# Patient Record
Sex: MALE | Race: BLACK OR AFRICAN AMERICAN
[De-identification: names, ages, dates, MRNs, and addresses within clinical notes are randomized per-mention and may not be internally consistent; named-entity substitution may affect disease eponyms.]

---

## 2020-08-24 ENCOUNTER — HOSPITAL ENCOUNTER (INPATIENT)
Dept: HOSPITAL 74 - YASAS | Age: 60
LOS: 18 days | Discharge: HOME | DRG: 772 | End: 2020-09-11
Attending: ALLERGY & IMMUNOLOGY | Admitting: ALLERGY & IMMUNOLOGY
Payer: COMMERCIAL

## 2020-08-24 VITALS — BODY MASS INDEX: 60 KG/M2

## 2020-08-24 DIAGNOSIS — Z86.19: ICD-10-CM

## 2020-08-24 DIAGNOSIS — F14.20: Primary | ICD-10-CM

## 2020-08-24 DIAGNOSIS — F17.211: ICD-10-CM

## 2020-08-24 DIAGNOSIS — Z87.09: ICD-10-CM

## 2020-08-24 PROCEDURE — HZ42ZZZ GROUP COUNSELING FOR SUBSTANCE ABUSE TREATMENT, COGNITIVE-BEHAVIORAL: ICD-10-PCS | Performed by: ALLERGY & IMMUNOLOGY

## 2020-08-24 RX ADMIN — Medication SCH TAB: at 20:20

## 2020-08-24 RX ADMIN — HYDROXYZINE PAMOATE SCH MG: 25 CAPSULE ORAL at 21:18

## 2020-08-24 RX ADMIN — Medication SCH: at 21:17

## 2020-08-24 RX ADMIN — Medication SCH MG: at 21:17

## 2020-08-24 NOTE — HP
CIWA Score





- Admission Criteria


[facility] Guidelines: Admission for Medically Managed Detox: 


Requires at least one of the followin. CIWA greater than 12


2. Seizures within the past 24 hours


3. Delirium tremens within the past 24 hours


4. Hallucinations within the past 24 hours


5. Acute intervention needed for co  occurring medical disorder


6. Acute intervention needed for co  occurring psychiatric disorder


7. Severe withdrawal that cannot be handled at a lower level of care (continued


    vomiting, continued diarrhea, abnormal vital signs) requiring intravenous


    medication and/or fluids


8. Pregnancy








Admitting History and Physical





- Admission


Chief Complaint: Cocaine abuse


History of Present Illness: 





Patient is a 59 y/o male who is here for cocaine abuse. Used cocaine yesterday, 

first started using 40 years ago. Takes years off in between, 7-8 years clean, 

then 3 years clean. Has been using for a year and a half straight. Use 3-4 times

a week, a gram or a gram and a half. Has never had physical symptoms of wi

thdrawl. Mental cravings. 





- Past Surgical History


Additional Past Surgical History: 





pneumothorax, R and L ankle surgeries





- Smoking History


Smoking history: Never smoked





- Alcohol/Substance Use


Hx Alcohol Use: Yes (socially)





- Social History


Usual Living Arrangement: Yes: With Spouse


Do you think of yourself as: Straight/Heterosexual


Occupation: construction 


History of Recent Travel: No





Admission ROS [facility]





- HPI


Chief Complaint: 





cocaine abuse


Allergies/Adverse Reactions: 


                                    Allergies











Allergy/AdvReac Type Severity Reaction Status Date / Time


 


No Known Allergies Allergy   Verified 20 18:50











Exam Limitations: No Limitations





- Ebola screening


Have you traveled outside of the country in the last 21 days: No


Have you had contact with anyone from an Ebola affected area: No


Have you been sick,other than usual withdrawal symptoms: No


Do you have a fever: No





- Review of Systems


Constitutional: No Symptoms Reported


Respiratory: reports: No Symptoms reported


Cardiac: reports: No Symptoms Reported


GI: reports: No Symptoms Reported





Patient History





- Smoking Cessation


Smoking history: Former smoker


Have you smoked in the past 12 months: Yes


Initiated information on smoking cessation: No





- Substances abused


  ** Cocaine


Substance route: Inhalation


Frequency: 3-6 times per week


Amount used: 1 1/2 gm


Age of first use: 25


Date of last use: 21





Admission Physical Exam [facility]





- Vital Signs


Vital Signs: 


                               Vital Signs - 24 hr











  20





  17:27


 


Temperature 97.2 F L


 


Pulse Rate 72


 


Respiratory 18





Rate 


 


Blood Pressure 124/88














- Physical


General Appearance: Yes: No Apparent Distress


HEENTM: Yes: Hearing grossly Normal


Respiratory: Yes: Normal Breath Sounds, No Respiratory Distress


Cardiology: Yes: Regular Rhythm, Regular Rate


Abdominal: Yes: Normal Bowel Sounds


Musculoskeletal: Yes: full range of Motion


Extremities: Yes: Normal Range of Motion





Breathalyzer





- Breathalyzer


Breathalyzer: 0





Vital Signs





- Vital Signs


Vital signs refused: No


Temperature: 97.2 F


Temperature source: Oral


Pulse Rate: 72


Respiratory Rate: 18


Blood Pressure: 124/88





- Height


Height: 5 ft 10 in





- Weight


Weight: 190 kg





- BMI


Body Mass Index (BMI): 60.0





Urine Drug Screen





- Test Device


Lot number: [id number]


Expiration date: 22





- Control


Is test valid?: Yes





- Results


Drug screen NEGATIVE: No


Urine drug screen results: KEY-Cocaine





Inpatient Rehab Admission





- Rehab Decision to Admit


Inpatient rehab admission?: Yes





- Initial Determination


Are CD services needed?: No


Free of communicable disease: Yes


Not in need of hospitalization: Yes





- Rehab Admission Criteria


Previous failed treatment: Yes


Poor recovery environment: Yes


Comorbidities: No


Lacks judgement: No


Patient is meeting Inpatient Rehab admission criteria:: Yes

## 2020-08-24 NOTE — PN
Teaching Attending Note


Name of Resident: Tressa Fitch





ATTENDING PHYSICIAN STATEMENT





I saw and evaluated the patient.


I reviewed the resident's note and discussed the case with the resident.


I agree with the resident's findings and plan as documented.








SUBJECTIVE: 60  y.o.  male  requesting  rehab  from cocaine  use . 








OBJECTIVE: wnwd  


                               Vital Signs - 24 hr











  08/24/20 08/24/20





  17:27 18:48


 


Temperature 97.2 F L 97.2 F L


 


Pulse Rate 72 72


 


Respiratory 18 18





Rate  


 


Blood Pressure 124/88 124/88

















ASSESSMENT AND PLAN: Cocaine  use  d/o  -  rehab

## 2020-08-24 NOTE — BHS.RME
Substance Use & Tx History





- Substance Use History


  ** Cocaine- Powder


Substance amount: 1 - 1.5 gm


Frequency of use: More than 3 times per week


Substance route: Inhalation (ex: sniffing or snorting)


Date of Last Use: 08/23/20





- Last Treatment


Date of last treatment: 8 months ago


Treatment type: Substance Use Disorder (RAYMUNDO)


Where was last treatment: Rehab





Physical/Psych/Mental Status





- Behavior


Eye Contact: Normal





- Cooperativeness


Cooperativeness: Cooperative





- Thinking


Thought Processes: Goal Directed


Thought content: Future oriented





- Physical Health Problems


Is patient presently having any pain?: No


Does patient presently have any injuries (include location): No


Does patient currently have a fever: No

## 2020-08-25 LAB
ALBUMIN SERPL-MCNC: 3.1 G/DL (ref 3.4–5)
ALP SERPL-CCNC: 98 U/L (ref 45–117)
ALT SERPL-CCNC: 18 U/L (ref 13–61)
ANION GAP SERPL CALC-SCNC: 7 MMOL/L (ref 8–16)
AST SERPL-CCNC: 13 U/L (ref 15–37)
BILIRUB SERPL-MCNC: 0.6 MG/DL (ref 0.2–1)
BUN SERPL-MCNC: 14.1 MG/DL (ref 7–18)
CALCIUM SERPL-MCNC: 8.4 MG/DL (ref 8.5–10.1)
CHLORIDE SERPL-SCNC: 108 MMOL/L (ref 98–107)
CO2 SERPL-SCNC: 25 MMOL/L (ref 21–32)
CREAT SERPL-MCNC: 1.3 MG/DL (ref 0.55–1.3)
DEPRECATED RDW RBC AUTO: 14.1 % (ref 11.9–15.9)
GLUCOSE SERPL-MCNC: 130 MG/DL (ref 74–106)
HCT VFR BLD CALC: 39.6 % (ref 35.4–49)
HGB BLD-MCNC: 13.4 GM/DL (ref 11.7–16.9)
MCH RBC QN AUTO: 28.8 PG (ref 25.7–33.7)
MCHC RBC AUTO-ENTMCNC: 33.9 G/DL (ref 32–35.9)
MCV RBC: 84.9 FL (ref 80–96)
PLATELET # BLD AUTO: 125 K/MM3 (ref 134–434)
PMV BLD: 10.2 FL (ref 7.5–11.1)
POTASSIUM SERPLBLD-SCNC: 3.9 MMOL/L (ref 3.5–5.1)
PROT SERPL-MCNC: 6 G/DL (ref 6.4–8.2)
RBC # BLD AUTO: 4.67 M/MM3 (ref 4–5.6)
SICKLE CELL SCREEN: NEGATIVE
SODIUM SERPL-SCNC: 140 MMOL/L (ref 136–145)
WBC # BLD AUTO: 4.3 K/MM3 (ref 4–10)

## 2020-08-25 RX ADMIN — Medication SCH TAB: at 10:30

## 2020-08-25 RX ADMIN — HYDROXYZINE PAMOATE SCH: 25 CAPSULE ORAL at 06:24

## 2020-08-25 RX ADMIN — Medication SCH MG: at 21:31

## 2020-08-25 NOTE — EKG
Test Reason : 

Blood Pressure : ***/*** mmHG

Vent. Rate : 082 BPM     Atrial Rate : 082 BPM

   P-R Int : 162 ms          QRS Dur : 098 ms

    QT Int : 388 ms       P-R-T Axes : 069 061 054 degrees

   QTc Int : 453 ms

 

NORMAL SINUS RHYTHM

NORMAL ECG

NO PREVIOUS ECGS AVAILABLE

Confirmed by MD Adan, Salvador ([id number]) on 8/25/2020 12:39:44 PM

 

Referred By:             Confirmed By:Salvador Arellano MD

## 2020-08-25 NOTE — PN
[facility] Progress Note


Note: 


Patient with +syphilis serology, RPR is 1:1, indicating a history of previous 

infection.

## 2020-08-26 RX ADMIN — Medication SCH MG: at 21:33

## 2020-08-26 RX ADMIN — Medication SCH TAB: at 10:16

## 2020-08-26 RX ADMIN — Medication SCH MG: at 21:34

## 2020-08-26 NOTE — PN
[facility] Progress Note


Note: 


Elevated random blood glucose. 


                             Laboratory Last Values











WBC  4.3 K/mm3 (4.0-10.0)   08/25/20  08:30    


 


RBC  4.67 M/mm3 (4.00-5.60)   08/25/20  08:30    


 


Hgb  13.4 GM/dL (11.7-16.9)   08/25/20  08:30    


 


Hct  39.6 % (35.4-49)   08/25/20  08:30    


 


MCV  84.9 fl (80-96)   08/25/20  08:30    


 


MCH  28.8 pg (25.7-33.7)   08/25/20  08:30    


 


MCHC  33.9 g/dl (32.0-35.9)   08/25/20  08:30    


 


RDW  14.1 % (11.9-15.9)   08/25/20  08:30    


 


Plt Count  125 K/MM3 (134-434)  L  08/25/20  08:30    


 


MPV  10.2 fl (7.5-11.1)   08/25/20  08:30    


 


Sickle Cell Screen  Negative  (NEGATIVE)   08/25/20  08:30    


 


Sodium  140 mmol/L (136-145)   08/25/20  08:30    


 


Potassium  3.9 mmol/L (3.5-5.1)   08/25/20  08:30    


 


Chloride  108 mmol/L ()  H  08/25/20  08:30    


 


Carbon Dioxide  25 mmol/L (21-32)   08/25/20  08:30    


 


Anion Gap  7 MMOL/L (8-16)  L  08/25/20  08:30    


 


BUN  14.1 mg/dL (7-18)   08/25/20  08:30    


 


Creatinine  1.3 mg/dL (0.55-1.3)   08/25/20  08:30    


 


Est GFR (CKD-EPI)AfAm  69.22   08/25/20  08:30    


 


Est GFR (CKD-EPI)NonAf  59.72   08/25/20  08:30    


 


Random Glucose  130 mg/dL ()  H  08/25/20  08:30    


 


Calcium  8.4 mg/dL (8.5-10.1)  L  08/25/20  08:30    


 


Total Bilirubin  0.6 mg/dL (0.2-1)   08/25/20  08:30    


 


AST  13 U/L (15-37)  L  08/25/20  08:30    


 


ALT  18 U/L (13-61)   08/25/20  08:30    


 


Alkaline Phosphatase  98 U/L ()   08/25/20  08:30    


 


Total Protein  6.0 g/dl (6.4-8.2)  L  08/25/20  08:30    


 


Albumin  3.1 g/dl (3.4-5.0)  L  08/25/20  08:30    


 


Syphilis Serology  Reactive  (NONREACTIVE)  A*  08/25/20  08:30    


 


RPR Titer  Reactive 1:1  (NONREACTIVE)  H  08/25/20  08:30    








Discussed results with . Will order Hemoglobin A1c. Patient agreed.

## 2020-08-27 LAB
APPEARANCE UR: CLEAR
BACTERIA # UR AUTO: 8 /UL (ref 0–1359)
BILIRUB UR STRIP.AUTO-MCNC: NEGATIVE MG/DL
CASTS URNS QL MICRO: 0 /UL (ref 0–3.1)
COLOR UR: YELLOW
EPITH CASTS URNS QL MICRO: 4 /UL (ref 0–25.1)
KETONES UR QL STRIP: NEGATIVE
LEUKOCYTE ESTERASE UR QL STRIP.AUTO: (no result)
NITRITE UR QL STRIP: NEGATIVE
PH UR: 6.5 [PH] (ref 5–8)
PROT UR QL STRIP: NEGATIVE
PROT UR QL STRIP: NEGATIVE
RBC # BLD AUTO: 5 /UL (ref 0–23.9)
SP GR UR: 1.02 (ref 1.01–1.03)
UROBILINOGEN UR STRIP-MCNC: 0.2 MG/DL (ref 0.2–1)
WBC # UR AUTO: 10 /UL (ref 0–25.8)

## 2020-08-27 RX ADMIN — Medication SCH MG: at 21:22

## 2020-08-27 RX ADMIN — Medication SCH TAB: at 09:51

## 2020-08-27 NOTE — PN
[facility] Progress Note


Note: 





                                Laboratory Tests











  08/25/20 08/25/20 08/25/20





  08:30 08:30 08:30


 


WBC  4.3  


 


RBC  4.67  


 


Hgb  13.4  


 


Hct  39.6  


 


MCV  84.9  


 


MCH  28.8  


 


MCHC  33.9  


 


RDW  14.1  


 


Plt Count  125 L  


 


MPV  10.2  


 


Sickle Cell Screen  Negative  


 


Sodium   140 


 


Potassium   3.9 


 


Chloride   108 H 


 


Carbon Dioxide   25 


 


Anion Gap   7 L 


 


BUN   14.1 


 


Creatinine   1.3 


 


Est GFR (CKD-EPI)AfAm   69.22 


 


Est GFR (CKD-EPI)NonAf   59.72 


 


Random Glucose   130 H 


 


Hemoglobin A1c %   


 


Calcium   8.4 L 


 


Total Bilirubin   0.6 


 


AST   13 L 


 


ALT   18 


 


Alkaline Phosphatase   98 


 


Total Protein   6.0 L 


 


Albumin   3.1 L 


 


Urine Color   


 


Urine Appearance   


 


Urine pH   


 


Ur Specific Gravity   


 


Urine Protein   


 


Urine Glucose (UA)   


 


Urine Ketones   


 


Urine Blood   


 


Urine Nitrite   


 


Urine Bilirubin   


 


Urine Urobilinogen   


 


Ur Leukocyte Esterase   


 


Urine WBC (Auto)   


 


Urine RBC (Auto)   


 


Urine Casts (Auto)   


 


U Epithel Cells (Auto)   


 


Urine Bacteria (Auto)   


 


Syphilis Serology    Reactive A*


 


RPR Titer   














  08/25/20 08/27/20 08/27/20





  08:30 00:05 07:35


 


WBC   


 


RBC   


 


Hgb   


 


Hct   


 


MCV   


 


MCH   


 


MCHC   


 


RDW   


 


Plt Count   


 


MPV   


 


Sickle Cell Screen   


 


Sodium   


 


Potassium   


 


Chloride   


 


Carbon Dioxide   


 


Anion Gap   


 


BUN   


 


Creatinine   


 


Est GFR (CKD-EPI)AfAm   


 


Est GFR (CKD-EPI)NonAf   


 


Random Glucose   


 


Hemoglobin A1c %    5.4


 


Calcium   


 


Total Bilirubin   


 


AST   


 


ALT   


 


Alkaline Phosphatase   


 


Total Protein   


 


Albumin   


 


Urine Color   Yellow 


 


Urine Appearance   Clear 


 


Urine pH   6.5 


 


Ur Specific Gravity   1.018 


 


Urine Protein   Negative 


 


Urine Glucose (UA)   Negative 


 


Urine Ketones   Negative 


 


Urine Blood   Negative 


 


Urine Nitrite   Negative 


 


Urine Bilirubin   Negative 


 


Urine Urobilinogen   0.2 


 


Ur Leukocyte Esterase   Trace 


 


Urine WBC (Auto)   10 


 


Urine RBC (Auto)   5 


 


Urine Casts (Auto)   0 


 


U Epithel Cells (Auto)   4 


 


Urine Bacteria (Auto)   8 


 


Syphilis Serology   


 


RPR Titer  Reactive 1:1 H  








HgbA1C = 5.4

## 2020-08-28 RX ADMIN — Medication SCH TAB: at 09:36

## 2020-08-28 RX ADMIN — Medication SCH MG: at 21:22

## 2020-08-29 RX ADMIN — Medication SCH MG: at 21:35

## 2020-08-29 RX ADMIN — Medication SCH TAB: at 09:37

## 2020-08-30 RX ADMIN — Medication SCH TAB: at 09:46

## 2020-08-30 RX ADMIN — Medication SCH MG: at 21:20

## 2020-08-31 RX ADMIN — Medication SCH TAB: at 10:02

## 2020-08-31 RX ADMIN — Medication SCH MG: at 21:24

## 2020-08-31 RX ADMIN — Medication SCH MG: at 21:23

## 2020-09-01 RX ADMIN — Medication SCH MG: at 21:34

## 2020-09-01 RX ADMIN — Medication SCH TAB: at 10:17

## 2020-09-02 RX ADMIN — HYDROXYZINE PAMOATE PRN MG: 25 CAPSULE ORAL at 21:37

## 2020-09-02 RX ADMIN — Medication SCH MG: at 21:36

## 2020-09-02 RX ADMIN — Medication SCH TAB: at 10:03

## 2020-09-03 RX ADMIN — Medication SCH MG: at 21:34

## 2020-09-03 RX ADMIN — Medication SCH TAB: at 10:08

## 2020-09-03 RX ADMIN — Medication SCH MG: at 21:33

## 2020-09-03 RX ADMIN — HYDROXYZINE PAMOATE PRN MG: 25 CAPSULE ORAL at 21:35

## 2020-09-04 RX ADMIN — HYDROXYZINE PAMOATE PRN MG: 25 CAPSULE ORAL at 21:06

## 2020-09-04 RX ADMIN — Medication SCH MG: at 21:06

## 2020-09-04 RX ADMIN — Medication SCH TAB: at 09:54

## 2020-09-05 RX ADMIN — Medication SCH MG: at 21:37

## 2020-09-05 RX ADMIN — Medication SCH TAB: at 09:57

## 2020-09-05 RX ADMIN — HYDROXYZINE PAMOATE PRN MG: 25 CAPSULE ORAL at 21:38

## 2020-09-06 RX ADMIN — Medication SCH MG: at 21:32

## 2020-09-06 RX ADMIN — Medication SCH TAB: at 09:45

## 2020-09-07 RX ADMIN — Medication SCH MG: at 21:44

## 2020-09-07 RX ADMIN — Medication SCH TAB: at 10:14

## 2020-09-08 RX ADMIN — Medication SCH TAB: at 10:09

## 2020-09-08 RX ADMIN — Medication SCH MG: at 21:08

## 2020-09-08 RX ADMIN — HYDROXYZINE PAMOATE PRN MG: 25 CAPSULE ORAL at 21:09

## 2020-09-09 RX ADMIN — HYDROXYZINE PAMOATE PRN MG: 25 CAPSULE ORAL at 21:41

## 2020-09-09 RX ADMIN — Medication SCH TAB: at 09:57

## 2020-09-09 RX ADMIN — Medication SCH MG: at 21:41

## 2020-09-10 RX ADMIN — Medication SCH MG: at 21:12

## 2020-09-10 RX ADMIN — Medication SCH TAB: at 09:57

## 2020-09-10 RX ADMIN — HYDROXYZINE PAMOATE PRN MG: 25 CAPSULE ORAL at 21:12

## 2020-09-11 VITALS — SYSTOLIC BLOOD PRESSURE: 108 MMHG | DIASTOLIC BLOOD PRESSURE: 69 MMHG | HEART RATE: 84 BPM | TEMPERATURE: 97.7 F

## 2020-09-11 RX ADMIN — Medication SCH TAB: at 09:36

## 2020-09-11 NOTE — DS
[facility] Rehab Discharge Summary





- [facility] Rehab Discharge Summary


Admission Date: 08/24/20


Discharge Date: 09/11/20





- History


Present History: Cocaine dependence





- Discharge Physical Exam


Vital Signs: 


                                   Vital Signs











Temperature  97.7 F   09/11/20 08:09


 


Pulse Rate  84   09/11/20 08:09


 


Respiratory Rate  16   09/11/20 08:09


 


Blood Pressure  108/69   09/11/20 08:09


 


O2 Sat by Pulse Oximetry (%)  96   09/11/20 08:09








                                Laboratory Tests











  08/25/20 08/25/20 08/25/20





  08:30 08:30 08:30


 


WBC  4.3  


 


RBC  4.67  


 


Hgb  13.4  


 


Hct  39.6  


 


MCV  84.9  


 


MCH  28.8  


 


MCHC  33.9  


 


RDW  14.1  


 


Plt Count  125 L  


 


MPV  10.2  


 


Sickle Cell Screen  Negative  


 


Sodium   140 


 


Potassium   3.9 


 


Chloride   108 H 


 


Carbon Dioxide   25 


 


Anion Gap   7 L 


 


BUN   14.1 


 


Creatinine   1.3 


 


Est GFR (CKD-EPI)AfAm   69.22 


 


Est GFR (CKD-EPI)NonAf   59.72 


 


POC Glucometer   


 


Random Glucose   130 H 


 


Hemoglobin A1c %   


 


Calcium   8.4 L 


 


Total Bilirubin   0.6 


 


AST   13 L 


 


ALT   18 


 


Alkaline Phosphatase   98 


 


Total Protein   6.0 L 


 


Albumin   3.1 L 


 


Urine Color   


 


Urine Appearance   


 


Urine pH   


 


Ur Specific Gravity   


 


Urine Protein   


 


Urine Glucose (UA)   


 


Urine Ketones   


 


Urine Blood   


 


Urine Nitrite   


 


Urine Bilirubin   


 


Urine Urobilinogen   


 


Ur Leukocyte Esterase   


 


Urine WBC (Auto)   


 


Urine RBC (Auto)   


 


Urine Casts (Auto)   


 


U Epithel Cells (Auto)   


 


Urine Bacteria (Auto)   


 


Syphilis Serology    Reactive A*


 


RPR Titer   














  08/25/20 08/27/20 08/27/20





  08:30 00:05 07:35


 


WBC   


 


RBC   


 


Hgb   


 


Hct   


 


MCV   


 


MCH   


 


MCHC   


 


RDW   


 


Plt Count   


 


MPV   


 


Sickle Cell Screen   


 


Sodium   


 


Potassium   


 


Chloride   


 


Carbon Dioxide   


 


Anion Gap   


 


BUN   


 


Creatinine   


 


Est GFR (CKD-EPI)AfAm   


 


Est GFR (CKD-EPI)NonAf   


 


POC Glucometer   


 


Random Glucose   


 


Hemoglobin A1c %    5.4


 


Calcium   


 


Total Bilirubin   


 


AST   


 


ALT   


 


Alkaline Phosphatase   


 


Total Protein   


 


Albumin   


 


Urine Color   Yellow 


 


Urine Appearance   Clear 


 


Urine pH   6.5 


 


Ur Specific Gravity   1.018 


 


Urine Protein   Negative 


 


Urine Glucose (UA)   Negative 


 


Urine Ketones   Negative 


 


Urine Blood   Negative 


 


Urine Nitrite   Negative 


 


Urine Bilirubin   Negative 


 


Urine Urobilinogen   0.2 


 


Ur Leukocyte Esterase   Trace 


 


Urine WBC (Auto)   10 


 


Urine RBC (Auto)   5 


 


Urine Casts (Auto)   0 


 


U Epithel Cells (Auto)   4 


 


Urine Bacteria (Auto)   8 


 


Syphilis Serology   


 


RPR Titer  Reactive 1:1 H  














  08/28/20





  06:27


 


WBC 


 


RBC 


 


Hgb 


 


Hct 


 


MCV 


 


MCH 


 


MCHC 


 


RDW 


 


Plt Count 


 


MPV 


 


Sickle Cell Screen 


 


Sodium 


 


Potassium 


 


Chloride 


 


Carbon Dioxide 


 


Anion Gap 


 


BUN 


 


Creatinine 


 


Est GFR (CKD-EPI)AfAm 


 


Est GFR (CKD-EPI)NonAf 


 


POC Glucometer  98


 


Random Glucose 


 


Hemoglobin A1c % 


 


Calcium 


 


Total Bilirubin 


 


AST 


 


ALT 


 


Alkaline Phosphatase 


 


Total Protein 


 


Albumin 


 


Urine Color 


 


Urine Appearance 


 


Urine pH 


 


Ur Specific Gravity 


 


Urine Protein 


 


Urine Glucose (UA) 


 


Urine Ketones 


 


Urine Blood 


 


Urine Nitrite 


 


Urine Bilirubin 


 


Urine Urobilinogen 


 


Ur Leukocyte Esterase 


 


Urine WBC (Auto) 


 


Urine RBC (Auto) 


 


Urine Casts (Auto) 


 


U Epithel Cells (Auto) 


 


Urine Bacteria (Auto) 


 


Syphilis Serology 


 


RPR Titer 








ros: denies shakes, anxiety, restlessness, sweating and cocaine cravings





pe:





alert and oriented x 3


skin warm and dry


car s1s2, rrr, no murmurs or gallops


resp cta bl,  no wheezes or rales


ext full, rom, amb ad dhiraj


no tremors


denies si/hi





a/p:





cocaine dependence








patient is  medically stable for discharge


aftercare arranged for [facility], appt 9/14/2020





- Treatment


Discharge Condition: Discharge condition good, Rehabilitated safely, Responded 

well, Outpatient referral accepted


Hospital Course: 





Patient discharged from rehab today for cocaine dependence. Patient is medically

stable and denies SI/HI. Aftercare arranged for [facility], intake appt 

9/14/2020. During course of treatment, patient attended group meetings and 1:1 

sessions with counseling staff. Patient reports he is motivated to maintain s

obriety and able to identify positive coping measures. Patient  medically 

advised to follow up with PCP as recommended and to continue with outpatient 

treatment to prevent relapse. 


Ambulatory Orders





NK [No Known Home Medication]  08/24/20 











- Medication


Discharge Medications: 


Ambulatory Orders





NK [No Known Home Medication]  08/24/20 











- Medication-Assisted Treatment (MAT)


Medication-Assisted Treatment (MAT): No


MAT [facility] 


[facility]14[facility]





- Discharge Instructions


Diet, activity, other medical instructions: 





Diet: regular





Activity: as tolerated





Other medical instructions:f/u with pcp as recommended











- Follow-up Referral


Minutes to complete discharge: 40





- AMA


Did Patient Leave Against Medical Advice: No